# Patient Record
Sex: MALE | Race: BLACK OR AFRICAN AMERICAN | NOT HISPANIC OR LATINO | URBAN - METROPOLITAN AREA
[De-identification: names, ages, dates, MRNs, and addresses within clinical notes are randomized per-mention and may not be internally consistent; named-entity substitution may affect disease eponyms.]

---

## 2017-03-20 ENCOUNTER — EMERGENCY (EMERGENCY)
Facility: HOSPITAL | Age: 62
LOS: 0 days | Discharge: HOME | End: 2017-03-20

## 2017-03-23 PROBLEM — Z00.00 ENCOUNTER FOR PREVENTIVE HEALTH EXAMINATION: Status: ACTIVE | Noted: 2017-03-23

## 2017-04-21 ENCOUNTER — APPOINTMENT (OUTPATIENT)
Dept: OTOLARYNGOLOGY | Facility: CLINIC | Age: 62
End: 2017-04-21

## 2017-06-27 DIAGNOSIS — R04.0 EPISTAXIS: ICD-10-CM

## 2017-06-27 DIAGNOSIS — I10 ESSENTIAL (PRIMARY) HYPERTENSION: ICD-10-CM

## 2017-06-27 DIAGNOSIS — Z79.82 LONG TERM (CURRENT) USE OF ASPIRIN: ICD-10-CM

## 2019-09-04 ENCOUNTER — APPOINTMENT (OUTPATIENT)
Dept: OTOLARYNGOLOGY | Facility: CLINIC | Age: 64
End: 2019-09-04
Payer: MEDICARE

## 2019-09-04 VITALS
DIASTOLIC BLOOD PRESSURE: 80 MMHG | BODY MASS INDEX: 27.32 KG/M2 | HEIGHT: 66 IN | WEIGHT: 170 LBS | SYSTOLIC BLOOD PRESSURE: 124 MMHG

## 2019-09-04 DIAGNOSIS — I10 ESSENTIAL (PRIMARY) HYPERTENSION: ICD-10-CM

## 2019-09-04 DIAGNOSIS — H61.23 IMPACTED CERUMEN, BILATERAL: ICD-10-CM

## 2019-09-04 DIAGNOSIS — Z78.9 OTHER SPECIFIED HEALTH STATUS: ICD-10-CM

## 2019-09-04 PROCEDURE — 31575 DIAGNOSTIC LARYNGOSCOPY: CPT

## 2019-09-04 PROCEDURE — 69210 REMOVE IMPACTED EAR WAX UNI: CPT

## 2019-09-04 PROCEDURE — 99204 OFFICE O/P NEW MOD 45 MIN: CPT | Mod: 25

## 2019-09-04 RX ORDER — FLUTICASONE PROPIONATE 50 UG/1
50 SPRAY, METERED NASAL DAILY
Qty: 1 | Refills: 6 | Status: ACTIVE | COMMUNITY
Start: 2019-09-04 | End: 1900-01-01

## 2019-09-04 RX ORDER — RANITIDINE HYDROCHLORIDE 150 MG/1
150 CAPSULE ORAL
Qty: 30 | Refills: 0 | Status: ACTIVE | COMMUNITY
Start: 2019-09-04 | End: 1900-01-01

## 2019-09-04 RX ORDER — AZITHROMYCIN 250 MG/1
250 TABLET, FILM COATED ORAL
Qty: 6 | Refills: 0 | Status: ACTIVE | COMMUNITY
Start: 2019-09-04 | End: 1900-01-01

## 2019-09-04 NOTE — PHYSICAL EXAM
[Midline] : trachea located in midline position [Normal] : no rashes [de-identified] : cerumen bilaterally

## 2019-09-04 NOTE — HISTORY OF PRESENT ILLNESS
[de-identified] : Patient here today c/o persistent cough. He admits about 2-3 months ago he had bronchitis. He states after antibiotics he felt much better but still feels a lingering cough. Cough currently described as a hard heavy cough. He states the cough is so severe that it "cuts his throat".  Patient notes symptoms began when the air condition filters were changed at his job. 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 He sometimes looses his breath. Denies dysphagia and odynophagia. Notes occasional hoarseness. Pt had negative chest xray.

## 2019-09-04 NOTE — PROCEDURE
[Complicated Symptoms] : complicated symptoms requiring more thorough examination than provided by mirror [Topical Lidocaine] : topical lidocaine [Flexible Endoscope] : examined with the flexible endoscope [Oxymetazoline HCl] : oxymetazoline HCl [Normal] : the epiglottis was regular without inflammation, lesions or masses, with regular aryepiglottic folds, and a smooth petiolus [True Vocal Cords Erythematous] : bilateral true vocal cord edema [Arytenoid Edema ___ /4] : arytenoid edema [unfilled]U/4 [Glottis Arytenoid Cartilages Erythema] : bilateral arytenoid ~M erythema [Arytenoid Erythema ___ /4] : arytenoid erythema [unfilled]U/4 [Interarytenoid Edema] : interarytenoid area edematous

## 2019-10-02 ENCOUNTER — APPOINTMENT (OUTPATIENT)
Dept: OTOLARYNGOLOGY | Facility: CLINIC | Age: 64
End: 2019-10-02
Payer: MEDICARE

## 2019-10-02 DIAGNOSIS — R05 COUGH: ICD-10-CM

## 2019-10-02 DIAGNOSIS — B20 HUMAN IMMUNODEFICIENCY VIRUS [HIV] DISEASE: ICD-10-CM

## 2019-10-02 DIAGNOSIS — J38.4 EDEMA OF LARYNX: ICD-10-CM

## 2019-10-02 PROCEDURE — 99212 OFFICE O/P EST SF 10 MIN: CPT | Mod: 25

## 2019-10-02 PROCEDURE — 31575 DIAGNOSTIC LARYNGOSCOPY: CPT

## 2019-10-02 RX ORDER — DARUNAVIR, COBICISTAT, EMTRICITABINE, AND TENOFOVIR ALAFENAMIDE 800; 150; 200; 10 MG/1; MG/1; MG/1; MG/1
800-150-200-10 TABLET, FILM COATED ORAL
Refills: 0 | Status: ACTIVE | COMMUNITY

## 2019-10-02 NOTE — REASON FOR VISIT
[Subsequent Evaluation] : a subsequent evaluation for [FreeTextEntry2] : chronic cough, laryngeal edema

## 2019-10-02 NOTE — PROCEDURE
[Complicated Symptoms] : complicated symptoms requiring more thorough examination than provided by mirror [Hoarseness] : hoarseness not clearly evaluated by indirect laryngoscopy [Topical Lidocaine] : topical lidocaine [Oxymetazoline HCl] : oxymetazoline HCl [Flexible Endoscope] : examined with the flexible endoscope [True Vocal Cords Erythematous] : bilateral true vocal cord edema [Arytenoid Edema ___ /4] : arytenoid edema [unfilled]U/4 [Arytenoid Erythema ___ /4] : arytenoid erythema [unfilled]U/4 [Interarytenoid Edema] : interarytenoid area edematous

## 2019-10-02 NOTE — HISTORY OF PRESENT ILLNESS
[FreeTextEntry1] : Patient following up on chronic cough and laryngeal edema. Patient seen last month and prescribed Pantoprazole, Ranitidine, Z-pack, Flonase and Levocetrizine. Patient states to have completed course of medications, however patient was seen in the ER again after being seen secondary to productive cough. He was prescribed Z-pack, benzonate and ProAir with relief.  Patient was told by his pharmacist that he was unable to take Flonase secondary to an interaction with his HIV medications. Patient notes improvement in symptoms, however cough is still present.

## 2019-11-22 ENCOUNTER — RX RENEWAL (OUTPATIENT)
Age: 64
End: 2019-11-22

## 2019-11-22 RX ORDER — PANTOPRAZOLE 40 MG/1
40 TABLET, DELAYED RELEASE ORAL TWICE DAILY
Qty: 60 | Refills: 1 | Status: ACTIVE | COMMUNITY
Start: 2019-09-04 | End: 1900-01-01

## 2019-12-05 ENCOUNTER — APPOINTMENT (OUTPATIENT)
Dept: OTOLARYNGOLOGY | Facility: CLINIC | Age: 64
End: 2019-12-05

## 2020-01-13 ENCOUNTER — RX RENEWAL (OUTPATIENT)
Age: 65
End: 2020-01-13

## 2020-01-13 RX ORDER — LEVOCETIRIZINE DIHYDROCHLORIDE 5 MG/1
5 TABLET ORAL DAILY
Qty: 30 | Refills: 2 | Status: ACTIVE | COMMUNITY
Start: 2019-09-04 | End: 1900-01-01

## 2020-03-02 ENCOUNTER — OUTPATIENT (OUTPATIENT)
Dept: OUTPATIENT SERVICES | Facility: HOSPITAL | Age: 65
LOS: 1 days | Discharge: HOME | End: 2020-03-02
Payer: MEDICARE

## 2020-03-02 VITALS
SYSTOLIC BLOOD PRESSURE: 130 MMHG | WEIGHT: 175.05 LBS | HEART RATE: 61 BPM | OXYGEN SATURATION: 97 % | DIASTOLIC BLOOD PRESSURE: 93 MMHG | TEMPERATURE: 98 F | HEIGHT: 66 IN | RESPIRATION RATE: 18 BRPM

## 2020-03-02 DIAGNOSIS — M25.512 PAIN IN LEFT SHOULDER: ICD-10-CM

## 2020-03-02 DIAGNOSIS — Z01.818 ENCOUNTER FOR OTHER PREPROCEDURAL EXAMINATION: ICD-10-CM

## 2020-03-02 LAB
ALBUMIN SERPL ELPH-MCNC: 4.4 G/DL — SIGNIFICANT CHANGE UP (ref 3.5–5.2)
ALP SERPL-CCNC: 87 U/L — SIGNIFICANT CHANGE UP (ref 30–115)
ALT FLD-CCNC: 27 U/L — SIGNIFICANT CHANGE UP (ref 0–41)
ANION GAP SERPL CALC-SCNC: 13 MMOL/L — SIGNIFICANT CHANGE UP (ref 7–14)
APTT BLD: 35.9 SEC — SIGNIFICANT CHANGE UP (ref 27–39.2)
AST SERPL-CCNC: 38 U/L — SIGNIFICANT CHANGE UP (ref 0–41)
BASOPHILS # BLD AUTO: 0.04 K/UL — SIGNIFICANT CHANGE UP (ref 0–0.2)
BASOPHILS NFR BLD AUTO: 0.9 % — SIGNIFICANT CHANGE UP (ref 0–1)
BILIRUB SERPL-MCNC: 0.7 MG/DL — SIGNIFICANT CHANGE UP (ref 0.2–1.2)
BUN SERPL-MCNC: 13 MG/DL — SIGNIFICANT CHANGE UP (ref 10–20)
CALCIUM SERPL-MCNC: 10.2 MG/DL — HIGH (ref 8.5–10.1)
CHLORIDE SERPL-SCNC: 104 MMOL/L — SIGNIFICANT CHANGE UP (ref 98–110)
CO2 SERPL-SCNC: 24 MMOL/L — SIGNIFICANT CHANGE UP (ref 17–32)
CREAT SERPL-MCNC: 1.4 MG/DL — SIGNIFICANT CHANGE UP (ref 0.7–1.5)
EOSINOPHIL # BLD AUTO: 0.23 K/UL — SIGNIFICANT CHANGE UP (ref 0–0.7)
EOSINOPHIL NFR BLD AUTO: 5.1 % — SIGNIFICANT CHANGE UP (ref 0–8)
GLUCOSE SERPL-MCNC: 109 MG/DL — HIGH (ref 70–99)
HCT VFR BLD CALC: 45.3 % — SIGNIFICANT CHANGE UP (ref 42–52)
HGB BLD-MCNC: 15.9 G/DL — SIGNIFICANT CHANGE UP (ref 14–18)
IMM GRANULOCYTES NFR BLD AUTO: 0.2 % — SIGNIFICANT CHANGE UP (ref 0.1–0.3)
INR BLD: 1.18 RATIO — SIGNIFICANT CHANGE UP (ref 0.65–1.3)
LYMPHOCYTES # BLD AUTO: 1.91 K/UL — SIGNIFICANT CHANGE UP (ref 1.2–3.4)
LYMPHOCYTES # BLD AUTO: 42.4 % — SIGNIFICANT CHANGE UP (ref 20.5–51.1)
MCHC RBC-ENTMCNC: 32.1 PG — HIGH (ref 27–31)
MCHC RBC-ENTMCNC: 35.1 G/DL — SIGNIFICANT CHANGE UP (ref 32–37)
MCV RBC AUTO: 91.3 FL — SIGNIFICANT CHANGE UP (ref 80–94)
MONOCYTES # BLD AUTO: 0.78 K/UL — HIGH (ref 0.1–0.6)
MONOCYTES NFR BLD AUTO: 17.3 % — HIGH (ref 1.7–9.3)
NEUTROPHILS # BLD AUTO: 1.53 K/UL — SIGNIFICANT CHANGE UP (ref 1.4–6.5)
NEUTROPHILS NFR BLD AUTO: 34.1 % — LOW (ref 42.2–75.2)
NRBC # BLD: 0 /100 WBCS — SIGNIFICANT CHANGE UP (ref 0–0)
PLATELET # BLD AUTO: 160 K/UL — SIGNIFICANT CHANGE UP (ref 130–400)
POTASSIUM SERPL-MCNC: 4 MMOL/L — SIGNIFICANT CHANGE UP (ref 3.5–5)
POTASSIUM SERPL-SCNC: 4 MMOL/L — SIGNIFICANT CHANGE UP (ref 3.5–5)
PROT SERPL-MCNC: 8 G/DL — SIGNIFICANT CHANGE UP (ref 6–8)
PROTHROM AB SERPL-ACNC: 13.6 SEC — HIGH (ref 9.95–12.87)
RBC # BLD: 4.96 M/UL — SIGNIFICANT CHANGE UP (ref 4.7–6.1)
RBC # FLD: 13.8 % — SIGNIFICANT CHANGE UP (ref 11.5–14.5)
SODIUM SERPL-SCNC: 141 MMOL/L — SIGNIFICANT CHANGE UP (ref 135–146)
WBC # BLD: 4.5 K/UL — LOW (ref 4.8–10.8)
WBC # FLD AUTO: 4.5 K/UL — LOW (ref 4.8–10.8)

## 2020-03-02 PROCEDURE — 93010 ELECTROCARDIOGRAM REPORT: CPT

## 2020-03-02 NOTE — H&P PST ADULT - NSICDXPASTMEDICALHX_GEN_ALL_CORE_FT
PAST MEDICAL HISTORY:  Hepatitis C treated several yrs ago    HIV disease     HTN (hypertension)     Hypercholesteremia     Seasonal allergies

## 2020-05-07 ENCOUNTER — APPOINTMENT (OUTPATIENT)
Dept: OTOLARYNGOLOGY | Facility: CLINIC | Age: 65
End: 2020-05-07

## 2020-07-27 PROBLEM — B20 HUMAN IMMUNODEFICIENCY VIRUS [HIV] DISEASE: Chronic | Status: ACTIVE | Noted: 2020-03-02

## 2020-07-27 PROBLEM — J30.2 OTHER SEASONAL ALLERGIC RHINITIS: Chronic | Status: ACTIVE | Noted: 2020-03-02

## 2020-07-27 PROBLEM — E78.00 PURE HYPERCHOLESTEROLEMIA, UNSPECIFIED: Chronic | Status: ACTIVE | Noted: 2020-03-02

## 2020-07-27 PROBLEM — I10 ESSENTIAL (PRIMARY) HYPERTENSION: Chronic | Status: ACTIVE | Noted: 2020-03-02

## 2020-07-27 PROBLEM — B19.20 UNSPECIFIED VIRAL HEPATITIS C WITHOUT HEPATIC COMA: Chronic | Status: ACTIVE | Noted: 2020-03-02

## 2020-08-12 ENCOUNTER — OUTPATIENT (OUTPATIENT)
Dept: OUTPATIENT SERVICES | Facility: HOSPITAL | Age: 65
LOS: 1 days | Discharge: HOME | End: 2020-08-12
Payer: MEDICARE

## 2020-08-12 ENCOUNTER — RESULT REVIEW (OUTPATIENT)
Age: 65
End: 2020-08-12

## 2020-08-12 VITALS
TEMPERATURE: 98 F | SYSTOLIC BLOOD PRESSURE: 140 MMHG | DIASTOLIC BLOOD PRESSURE: 90 MMHG | HEIGHT: 66 IN | OXYGEN SATURATION: 96 % | WEIGHT: 183.2 LBS | HEART RATE: 69 BPM | RESPIRATION RATE: 18 BRPM

## 2020-08-12 DIAGNOSIS — Z01.818 ENCOUNTER FOR OTHER PREPROCEDURAL EXAMINATION: ICD-10-CM

## 2020-08-12 DIAGNOSIS — S83.207A UNSPECIFIED TEAR OF UNSPECIFIED MENISCUS, CURRENT INJURY, LEFT KNEE, INITIAL ENCOUNTER: Chronic | ICD-10-CM

## 2020-08-12 DIAGNOSIS — M25.512 PAIN IN LEFT SHOULDER: ICD-10-CM

## 2020-08-12 LAB
ALBUMIN SERPL ELPH-MCNC: 4.5 G/DL — SIGNIFICANT CHANGE UP (ref 3.5–5.2)
ALP SERPL-CCNC: 95 U/L — SIGNIFICANT CHANGE UP (ref 30–115)
ALT FLD-CCNC: 29 U/L — SIGNIFICANT CHANGE UP (ref 0–41)
ANION GAP SERPL CALC-SCNC: 11 MMOL/L — SIGNIFICANT CHANGE UP (ref 7–14)
APTT BLD: 32 SEC — SIGNIFICANT CHANGE UP (ref 27–39.2)
AST SERPL-CCNC: 45 U/L — HIGH (ref 0–41)
BASOPHILS # BLD AUTO: 0.05 K/UL — SIGNIFICANT CHANGE UP (ref 0–0.2)
BASOPHILS NFR BLD AUTO: 1.1 % — HIGH (ref 0–1)
BILIRUB SERPL-MCNC: 0.7 MG/DL — SIGNIFICANT CHANGE UP (ref 0.2–1.2)
BLD GP AB SCN SERPL QL: SIGNIFICANT CHANGE UP
BUN SERPL-MCNC: 12 MG/DL — SIGNIFICANT CHANGE UP (ref 10–20)
CALCIUM SERPL-MCNC: 10.2 MG/DL — HIGH (ref 8.5–10.1)
CHLORIDE SERPL-SCNC: 107 MMOL/L — SIGNIFICANT CHANGE UP (ref 98–110)
CO2 SERPL-SCNC: 24 MMOL/L — SIGNIFICANT CHANGE UP (ref 17–32)
CREAT SERPL-MCNC: 1.4 MG/DL — SIGNIFICANT CHANGE UP (ref 0.7–1.5)
EOSINOPHIL # BLD AUTO: 0.2 K/UL — SIGNIFICANT CHANGE UP (ref 0–0.7)
EOSINOPHIL NFR BLD AUTO: 4.5 % — SIGNIFICANT CHANGE UP (ref 0–8)
GLUCOSE SERPL-MCNC: 88 MG/DL — SIGNIFICANT CHANGE UP (ref 70–99)
HCT VFR BLD CALC: 46 % — SIGNIFICANT CHANGE UP (ref 42–52)
HGB BLD-MCNC: 15.3 G/DL — SIGNIFICANT CHANGE UP (ref 14–18)
IMM GRANULOCYTES NFR BLD AUTO: 0 % — LOW (ref 0.1–0.3)
INR BLD: 1.17 RATIO — SIGNIFICANT CHANGE UP (ref 0.65–1.3)
LYMPHOCYTES # BLD AUTO: 1.84 K/UL — SIGNIFICANT CHANGE UP (ref 1.2–3.4)
LYMPHOCYTES # BLD AUTO: 41.4 % — SIGNIFICANT CHANGE UP (ref 20.5–51.1)
MCHC RBC-ENTMCNC: 30.6 PG — SIGNIFICANT CHANGE UP (ref 27–31)
MCHC RBC-ENTMCNC: 33.3 G/DL — SIGNIFICANT CHANGE UP (ref 32–37)
MCV RBC AUTO: 92 FL — SIGNIFICANT CHANGE UP (ref 80–94)
MONOCYTES # BLD AUTO: 0.78 K/UL — HIGH (ref 0.1–0.6)
MONOCYTES NFR BLD AUTO: 17.6 % — HIGH (ref 1.7–9.3)
NEUTROPHILS # BLD AUTO: 1.57 K/UL — SIGNIFICANT CHANGE UP (ref 1.4–6.5)
NEUTROPHILS NFR BLD AUTO: 35.4 % — LOW (ref 42.2–75.2)
NRBC # BLD: 0 /100 WBCS — SIGNIFICANT CHANGE UP (ref 0–0)
PLATELET # BLD AUTO: 164 K/UL — SIGNIFICANT CHANGE UP (ref 130–400)
POTASSIUM SERPL-MCNC: 4.1 MMOL/L — SIGNIFICANT CHANGE UP (ref 3.5–5)
POTASSIUM SERPL-SCNC: 4.1 MMOL/L — SIGNIFICANT CHANGE UP (ref 3.5–5)
PROT SERPL-MCNC: 7.9 G/DL — SIGNIFICANT CHANGE UP (ref 6–8)
PROTHROM AB SERPL-ACNC: 13.4 SEC — HIGH (ref 9.95–12.87)
RBC # BLD: 5 M/UL — SIGNIFICANT CHANGE UP (ref 4.7–6.1)
RBC # FLD: 14.5 % — SIGNIFICANT CHANGE UP (ref 11.5–14.5)
SODIUM SERPL-SCNC: 142 MMOL/L — SIGNIFICANT CHANGE UP (ref 135–146)
WBC # BLD: 4.44 K/UL — LOW (ref 4.8–10.8)
WBC # FLD AUTO: 4.44 K/UL — LOW (ref 4.8–10.8)

## 2020-08-12 PROCEDURE — 71046 X-RAY EXAM CHEST 2 VIEWS: CPT | Mod: 26

## 2020-08-12 PROCEDURE — 93010 ELECTROCARDIOGRAM REPORT: CPT

## 2020-08-12 PROCEDURE — 73030 X-RAY EXAM OF SHOULDER: CPT | Mod: 26,LT

## 2020-08-12 RX ORDER — AMLODIPINE BESYLATE 2.5 MG/1
1 TABLET ORAL
Qty: 0 | Refills: 0 | DISCHARGE

## 2020-08-12 NOTE — H&P PST ADULT - NSANTHOSAYNRD_GEN_A_CORE
No. JORDI screening performed.  STOP BANG Legend: 0-2 = LOW Risk; 3-4 = INTERMEDIATE Risk; 5-8 = HIGH Risk
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
## 2020-08-23 ENCOUNTER — OUTPATIENT (OUTPATIENT)
Dept: OUTPATIENT SERVICES | Facility: HOSPITAL | Age: 65
LOS: 1 days | Discharge: HOME | End: 2020-08-23

## 2020-08-23 DIAGNOSIS — Z11.59 ENCOUNTER FOR SCREENING FOR OTHER VIRAL DISEASES: ICD-10-CM

## 2020-08-23 DIAGNOSIS — S83.207A UNSPECIFIED TEAR OF UNSPECIFIED MENISCUS, CURRENT INJURY, LEFT KNEE, INITIAL ENCOUNTER: Chronic | ICD-10-CM

## 2020-08-25 NOTE — ASU PATIENT PROFILE, ADULT - PMH
Hepatitis C  treated several yrs ago  HIV disease    HTN (hypertension)    Hypercholesteremia    Seasonal allergies

## 2020-08-26 ENCOUNTER — OUTPATIENT (OUTPATIENT)
Dept: OUTPATIENT SERVICES | Facility: HOSPITAL | Age: 65
LOS: 1 days | Discharge: HOME | End: 2020-08-26
Payer: MEDICARE

## 2020-08-26 ENCOUNTER — RESULT REVIEW (OUTPATIENT)
Age: 65
End: 2020-08-26

## 2020-08-26 VITALS
SYSTOLIC BLOOD PRESSURE: 136 MMHG | DIASTOLIC BLOOD PRESSURE: 86 MMHG | WEIGHT: 183.2 LBS | HEART RATE: 62 BPM | OXYGEN SATURATION: 98 % | RESPIRATION RATE: 18 BRPM | TEMPERATURE: 98 F

## 2020-08-26 VITALS
HEART RATE: 65 BPM | OXYGEN SATURATION: 96 % | RESPIRATION RATE: 17 BRPM | DIASTOLIC BLOOD PRESSURE: 79 MMHG | SYSTOLIC BLOOD PRESSURE: 121 MMHG

## 2020-08-26 DIAGNOSIS — S83.207A UNSPECIFIED TEAR OF UNSPECIFIED MENISCUS, CURRENT INJURY, LEFT KNEE, INITIAL ENCOUNTER: Chronic | ICD-10-CM

## 2020-08-26 DIAGNOSIS — Z87.39 PERSONAL HISTORY OF OTHER DISEASES OF THE MUSCULOSKELETAL SYSTEM AND CONNECTIVE TISSUE: ICD-10-CM

## 2020-08-26 PROCEDURE — 88304 TISSUE EXAM BY PATHOLOGIST: CPT | Mod: 26

## 2020-08-26 RX ORDER — ONDANSETRON 8 MG/1
4 TABLET, FILM COATED ORAL ONCE
Refills: 0 | Status: DISCONTINUED | OUTPATIENT
Start: 2020-08-26 | End: 2020-09-10

## 2020-08-26 RX ORDER — METOPROLOL TARTRATE 50 MG
1 TABLET ORAL
Qty: 0 | Refills: 0 | DISCHARGE

## 2020-08-26 RX ORDER — LEVOCETIRIZINE DIHYDROCHLORIDE 0.5 MG/ML
1 SOLUTION ORAL
Qty: 0 | Refills: 0 | DISCHARGE

## 2020-08-26 RX ORDER — MELOXICAM 15 MG/1
1 TABLET ORAL
Qty: 0 | Refills: 0 | DISCHARGE

## 2020-08-26 RX ORDER — HYDROMORPHONE HYDROCHLORIDE 2 MG/ML
0.5 INJECTION INTRAMUSCULAR; INTRAVENOUS; SUBCUTANEOUS
Refills: 0 | Status: DISCONTINUED | OUTPATIENT
Start: 2020-08-26 | End: 2020-08-26

## 2020-08-26 RX ORDER — TRAMADOL HYDROCHLORIDE 50 MG/1
1 TABLET ORAL
Qty: 0 | Refills: 0 | DISCHARGE

## 2020-08-26 RX ORDER — ASPIRIN/CALCIUM CARB/MAGNESIUM 324 MG
1 TABLET ORAL
Qty: 0 | Refills: 0 | DISCHARGE

## 2020-08-26 RX ORDER — SODIUM CHLORIDE 9 MG/ML
1000 INJECTION, SOLUTION INTRAVENOUS
Refills: 0 | Status: DISCONTINUED | OUTPATIENT
Start: 2020-08-26 | End: 2020-09-10

## 2020-08-26 RX ORDER — ASCORBIC ACID 60 MG
0 TABLET,CHEWABLE ORAL
Qty: 0 | Refills: 0 | DISCHARGE

## 2020-08-26 RX ORDER — AMLODIPINE BESYLATE 2.5 MG/1
1 TABLET ORAL
Qty: 0 | Refills: 0 | DISCHARGE

## 2020-08-26 RX ORDER — LOSARTAN POTASSIUM 100 MG/1
1 TABLET, FILM COATED ORAL
Qty: 0 | Refills: 0 | DISCHARGE

## 2020-08-26 RX ORDER — OXYCODONE AND ACETAMINOPHEN 5; 325 MG/1; MG/1
1 TABLET ORAL EVERY 4 HOURS
Refills: 0 | Status: DISCONTINUED | OUTPATIENT
Start: 2020-08-26 | End: 2020-08-26

## 2020-08-26 RX ADMIN — SODIUM CHLORIDE 100 MILLILITER(S): 9 INJECTION, SOLUTION INTRAVENOUS at 16:46

## 2020-08-26 NOTE — BRIEF OPERATIVE NOTE - NSICDXBRIEFPROCEDURE_GEN_ALL_CORE_FT
PROCEDURES:  Biceps tenodesis 26-Aug-2020 14:43:56  Zack Barajas  Arthroscopic debridement, shoulder, extensive 26-Aug-2020 14:43:52  Zack Barajas  Subacromial decompression 26-Aug-2020 14:43:45  Zack Barajas  Repair, rotator cuff, arthroscopic 26-Aug-2020 14:43:39  Zack Barajas

## 2020-08-26 NOTE — CHART NOTE - NSCHARTNOTEFT_GEN_A_CORE
PACU ANESTHESIA ADMISSION NOTE      Procedure: Biceps tenodesis  Arthroscopic debridement, shoulder, extensive  Subacromial decompression  Repair, rotator cuff, arthroscopic    Post op diagnosis:  History of rotator cuff tear      ____  Intubated  TV:______       Rate: ______      FiO2: ______    _x___  Patent Airway    _x___  Full return of protective reflexes    _x___  Full recovery from anesthesia / back to baseline status    Vitals:  T 97.5 f  HR: 64  BP: 133/80  RR: 16  SpO2: 100% on NC 3 L/min    Mental Status:  _x___ Awake   _____ Alert   __x___ Drowsy   _____ Sedated    Nausea/Vomiting:  _x___  NO       ______Yes,   See Post - Op Orders         Pain Scale (0-10):  __0___    Treatment: _x___ None    ____ See Post - Op/PCA Orders    Post - Operative Fluids:   ____ Oral   x____ See Post - Op Orders    Plan: Discharge:   _x___Home       _____Floor     _____Critical Care    _____  Other:_________________    Comments: uneventful GETA anesthetic, VS stable, full report to pacu rn.   No anesthesia issues or complications noted.  Discharge when criteria met.

## 2020-08-26 NOTE — ASU DISCHARGE PLAN (ADULT/PEDIATRIC) - ASU DC SPECIAL INSTRUCTIONSFT
Post Operative Instructions for Shoulder Surgery    Your Surgery Included  [x ] Rotator Cuff Repair			  [x ] Debridement				  [x ] Biceps Tenodesis/Tenotomy	  [ ] Distal Clavicle Resection		  [ ] SLAP Repair  [ ] Instability Repair  [ ] Lysis of Adhesions/Manipulation  [ ] Other:  	  Call our office ([phone]) immediately if you experience any of the following:  •	Excessive bleeding or pus like drainage at the incision site  •	Uncontrollable pain not relieved by pain medication  •	Excessive swelling or redness at the incision site  •	Fever above 101.5 degrees not controlled with Tylenol or Motrin  •	Shortness of Breath  •	Any foul odor or blistering from the surgery site    Pain Management: You were given one or more of the following medication prescriptions before leaving the hospital. Have the prescriptions filled at a pharmacy on your way home and follow the instructions on the bottles.   Regional Anesthesia Injections (Blocks): You may have been given a regional nerve block either before or after surgery. This may numb your shoulder for 24-36 hours    Diet: Eat a bland diet for the first day after surgery. Progress your diet as tolerated. Constipation may occur with Narcotic usage, contact our office if you are experiencing constipation.    Activity: After you arrive at home, spend most of the first 24 hours resting in bed, on the couch, or in a reclining chair. After the first 24 hours at home, slowly increase your activity level based on your symptoms.    Dressing Change: Remove the dressing on the 3rd day. It is normal for some blood to be seen on the dressings. It is also normal for you to see apparent bruising on the skin around your shoulder when you remove the dressing. If present, leave the steri-strip tape across the incisions. If you are concerned by the drainage or the appearance of your shoulder, please call one of the numbers listed below. Keep incisions covered with Band-Aids/bandages.    Showering: You may shower on the 5th day after surgery if the wound is dry and clean, but do not let the wound soak in water until sutures are removed. Do not submerge in any water until after your postoperative appointment in clinic.    Shoulder Sling: You may have been sent home with a sling / pillow attachment holding your arm away from your body. You may remove the sling when changing clothes or bathing. Make sure to wear the sling while sleeping unless instructed otherwise. You may remove for exercises.    Shoulder Exercises: You may do these exercises for 2-5 mins five times a day in order to help regain your range of motion.  [x ] Shoulder Shrugs: Shrug your shoulders up and down  [x ] Pendulums: Bend forward allowing your arm to hang down in front of you. Gently swing your arm side-to-side and front to back  [x ] Elbow range of motion: Straighten and bend your elbow  [x ] Scapula Retractions: Squeeze shoulder blades together while slightly pulling them down  [ ] Passive Abduction: Have family member lift your arm away from your body bringing your elbow to the level of your shoulder  [ ] Shoulder rotation: With your arm at your side, have a family member rotate your arm internally and externally  [ ] Pulley exercises: Put a towel over the top of a door and face the door, use your good arm to pull your arm up in front of you    Follow Up: As Scheduled

## 2020-08-26 NOTE — ASU DISCHARGE PLAN (ADULT/PEDIATRIC) - CARE PROVIDER_API CALL
Zack Barajas  [facility]  [address] [place] [id number]  Phone: [phone]  Fax: [phone]  Follow Up Time:

## 2020-08-28 LAB — SURGICAL PATHOLOGY STUDY: SIGNIFICANT CHANGE UP

## 2020-09-09 DIAGNOSIS — M75.02 ADHESIVE CAPSULITIS OF LEFT SHOULDER: ICD-10-CM

## 2020-09-09 DIAGNOSIS — Z79.82 LONG TERM (CURRENT) USE OF ASPIRIN: ICD-10-CM

## 2020-09-09 DIAGNOSIS — M66.822 SPONTANEOUS RUPTURE OF OTHER TENDONS, LEFT UPPER ARM: ICD-10-CM

## 2020-09-09 DIAGNOSIS — I12.9 HYPERTENSIVE CHRONIC KIDNEY DISEASE WITH STAGE 1 THROUGH STAGE 4 CHRONIC KIDNEY DISEASE, OR UNSPECIFIED CHRONIC KIDNEY DISEASE: ICD-10-CM

## 2020-09-09 DIAGNOSIS — M75.122 COMPLETE ROTATOR CUFF TEAR OR RUPTURE OF LEFT SHOULDER, NOT SPECIFIED AS TRAUMATIC: ICD-10-CM

## 2020-09-09 DIAGNOSIS — E78.00 PURE HYPERCHOLESTEROLEMIA, UNSPECIFIED: ICD-10-CM

## 2020-09-09 DIAGNOSIS — M25.812 OTHER SPECIFIED JOINT DISORDERS, LEFT SHOULDER: ICD-10-CM

## 2020-09-09 DIAGNOSIS — Z20.828 CONTACT WITH AND (SUSPECTED) EXPOSURE TO OTHER VIRAL COMMUNICABLE DISEASES: ICD-10-CM

## 2020-09-09 DIAGNOSIS — Z79.891 LONG TERM (CURRENT) USE OF OPIATE ANALGESIC: ICD-10-CM

## 2020-09-09 DIAGNOSIS — N18.3 CHRONIC KIDNEY DISEASE, STAGE 3 (MODERATE): ICD-10-CM

## 2020-09-09 DIAGNOSIS — Z87.891 PERSONAL HISTORY OF NICOTINE DEPENDENCE: ICD-10-CM

## 2020-09-09 DIAGNOSIS — B20 HUMAN IMMUNODEFICIENCY VIRUS [HIV] DISEASE: ICD-10-CM

## 2020-09-09 DIAGNOSIS — M24.112 OTHER ARTICULAR CARTILAGE DISORDERS, LEFT SHOULDER: ICD-10-CM

## 2020-09-09 DIAGNOSIS — M75.102 UNSPECIFIED ROTATOR CUFF TEAR OR RUPTURE OF LEFT SHOULDER, NOT SPECIFIED AS TRAUMATIC: ICD-10-CM

## 2020-12-31 ENCOUNTER — APPOINTMENT (OUTPATIENT)
Dept: OTOLARYNGOLOGY | Facility: CLINIC | Age: 65
End: 2020-12-31

## 2021-03-08 NOTE — H&P PST ADULT - MUSCULOSKELETAL
No joint pain, swelling or deformity; no limitation of movement negative No oral lesions; no gross abnormalities

## 2025-01-05 ENCOUNTER — NON-APPOINTMENT (OUTPATIENT)
Age: 70
End: 2025-01-05